# Patient Record
Sex: FEMALE | Race: WHITE
[De-identification: names, ages, dates, MRNs, and addresses within clinical notes are randomized per-mention and may not be internally consistent; named-entity substitution may affect disease eponyms.]

---

## 2020-08-19 ENCOUNTER — HOSPITAL ENCOUNTER (OUTPATIENT)
Dept: HOSPITAL 62 - SC | Age: 45
Discharge: HOME | End: 2020-08-19
Attending: OPHTHALMOLOGY
Payer: OTHER GOVERNMENT

## 2020-08-19 DIAGNOSIS — Z88.8: ICD-10-CM

## 2020-08-19 DIAGNOSIS — Z88.2: ICD-10-CM

## 2020-08-19 DIAGNOSIS — H25.12: Primary | ICD-10-CM

## 2020-08-19 PROCEDURE — V2632 POST CHMBR INTRAOCULAR LENS: HCPCS

## 2020-08-19 PROCEDURE — 66984 XCAPSL CTRC RMVL W/O ECP: CPT

## 2020-08-19 RX ADMIN — BESIFLOXACIN PRN DROP: 6 SUSPENSION OPHTHALMIC at 13:46

## 2020-08-19 RX ADMIN — PREDNISOLONE ACETATE PRN DROP: 10 SUSPENSION/ DROPS OPHTHALMIC at 14:43

## 2020-08-19 RX ADMIN — CYCLOPENTOLATE HYDROCHLORIDE AND PHENYLEPHRINE HYDROCHLORIDE PRN DROP: 2; 10 SOLUTION/ DROPS OPHTHALMIC at 13:46

## 2020-08-19 RX ADMIN — TROPICAMIDE PRN DROP: 10 SOLUTION/ DROPS OPHTHALMIC at 13:46

## 2020-08-19 RX ADMIN — BESIFLOXACIN PRN DROP: 6 SUSPENSION OPHTHALMIC at 00:00

## 2020-08-19 RX ADMIN — TETRACAINE HYDROCHLORIDE PRN DROP: 5 SOLUTION OPHTHALMIC at 14:11

## 2020-08-19 RX ADMIN — DORZOLAMIDE HYDROCHLORIDE AND TIMOLOL MALEATE PRN DROP: 20; 5 SOLUTION OPHTHALMIC at 14:43

## 2020-08-19 RX ADMIN — BESIFLOXACIN PRN DROP: 6 SUSPENSION OPHTHALMIC at 14:43

## 2020-08-19 RX ADMIN — TETRACAINE HYDROCHLORIDE PRN DROP: 5 SOLUTION OPHTHALMIC at 13:46

## 2020-08-19 RX ADMIN — CYCLOPENTOLATE HYDROCHLORIDE AND PHENYLEPHRINE HYDROCHLORIDE PRN DROP: 2; 10 SOLUTION/ DROPS OPHTHALMIC at 13:57

## 2020-08-19 RX ADMIN — TROPICAMIDE PRN DROP: 10 SOLUTION/ DROPS OPHTHALMIC at 14:10

## 2020-08-19 RX ADMIN — TETRACAINE HYDROCHLORIDE PRN DROP: 5 SOLUTION OPHTHALMIC at 14:23

## 2020-08-19 RX ADMIN — PREDNISOLONE ACETATE PRN DROP: 10 SUSPENSION/ DROPS OPHTHALMIC at 00:00

## 2020-08-19 RX ADMIN — CYCLOPENTOLATE HYDROCHLORIDE AND PHENYLEPHRINE HYDROCHLORIDE PRN DROP: 2; 10 SOLUTION/ DROPS OPHTHALMIC at 14:10

## 2020-08-19 RX ADMIN — DORZOLAMIDE HYDROCHLORIDE AND TIMOLOL MALEATE PRN DROP: 20; 5 SOLUTION OPHTHALMIC at 00:00

## 2020-08-19 RX ADMIN — TROPICAMIDE PRN DROP: 10 SOLUTION/ DROPS OPHTHALMIC at 13:57

## 2020-08-19 RX ADMIN — BESIFLOXACIN PRN DROP: 6 SUSPENSION OPHTHALMIC at 13:58

## 2020-08-19 NOTE — OPERATIVE REPORT
Operative Report-[facility]


Operative Report: 





DATE OF SURGERY: August 19, 2020


PREOPERATIVE DIAGNOSIS: NUCLEAR CATARACT, LEFT EYE.


POSTOPERATIVE DIAGNOSIS: NUCLEAR CATARACT, LEFT EYE.


PROCEDURE PERFORMED: PHACOEMULSIFICATION WITH POSTERIOR CHAMBER INTRAOCULAR LENS

IMPLANT, LEFT EYE.


SURGEON: Amandeep El DO


MEDICATIONS AND ANESTHESIA:


Versed: IV Versed Tetracaine drops: 1 to 2 drops given as needed


COMPLICATION: None


INDICATIONS FOR SURGERY: Medical necessity: Best corrected visual acuity worse 

than 20/40 secondary to cataracts with impairment of ability to carry out needs 

or desired activities, blurred vision, visual distortion, reduced contrast 

sensitivity and/or glare with association functional impairment and supporting 

documentation/testing, and cataracts causing symptomatic impairment of visual 

functions not corrected with tolerable changes in glasses or contact lenses 

interfering with activities of daily life.


PROCEDURE:


Consent: The risks, benefits and alternatives of this procedures was discussed 

with the patient. The patient read and signed the consent forms, was identified 

and was seated in the exam chair.


IOL: MX 60 E2.0


IOL Diopters:


Phacoemulsification with posterior chamber intraocular lens implant: The face 

was prepped with 5% povidone iodine solution, and a few drops of 5% povidone 

iodine solution was instilled into the inferior fornix. A non-fenestrated drape 

was placed over the eye and the lids were parted with the speculum. A 

paracentesis was made with a 15 degree blade, and 1% lidocaine MPF followed by 

viscoelastic was injected into the anterior chamber. A 2.4 mm metal micro-

keratome was used to create a temporal clear corneal incision. A circular 

anterior capsulorrhexis was created, followed by hydro-dissection and hydro-

delineation. The phacoemulsification hand piece was inserted and the nucleus was

removed with the Phaco chop technique. The irrigation-aspiration hand piece was 

used to remove the residual cortex, and vacuum the posterior capsule. The 

capsular bag was inflated and viscoelastic and the above-mentioned IOL was 

injected into the eye with care to insert both leaning and trailing haptics in 

the capsular bag. The irrigation/aspiration hand piece was reinserted to remove 

residual viscoelastic from the capsular bag and anterior chamber. The corneal 

incision was hydrated, and anterior chamber was inflated with sterile BSS via 

the paracentesis site, and found to be watertight.


Postop medication:1 drop of prednisolone into operative by followed by 1 drop of

Cosopt into operative eye followed by 1 drop of Besivance intraoperative by  

Other:

## 2020-08-26 ENCOUNTER — HOSPITAL ENCOUNTER (OUTPATIENT)
Dept: HOSPITAL 62 - SC | Age: 45
Discharge: HOME | End: 2020-08-26
Attending: OPHTHALMOLOGY
Payer: COMMERCIAL

## 2020-08-26 DIAGNOSIS — Z98.42: ICD-10-CM

## 2020-08-26 DIAGNOSIS — H25.11: Primary | ICD-10-CM

## 2020-08-26 PROCEDURE — V2632 POST CHMBR INTRAOCULAR LENS: HCPCS

## 2020-08-26 PROCEDURE — 66984 XCAPSL CTRC RMVL W/O ECP: CPT

## 2020-08-26 RX ADMIN — TETRACAINE HYDROCHLORIDE PRN DROP: 5 SOLUTION OPHTHALMIC at 09:44

## 2020-08-26 RX ADMIN — CYCLOPENTOLATE HYDROCHLORIDE AND PHENYLEPHRINE HYDROCHLORIDE PRN DROP: 2; 10 SOLUTION/ DROPS OPHTHALMIC at 09:27

## 2020-08-26 RX ADMIN — TROPICAMIDE PRN DROP: 10 SOLUTION/ DROPS OPHTHALMIC at 09:27

## 2020-08-26 RX ADMIN — TROPICAMIDE PRN DROP: 10 SOLUTION/ DROPS OPHTHALMIC at 09:19

## 2020-08-26 RX ADMIN — BESIFLOXACIN PRN DROP: 6 SUSPENSION OPHTHALMIC at 10:02

## 2020-08-26 RX ADMIN — CYCLOPENTOLATE HYDROCHLORIDE AND PHENYLEPHRINE HYDROCHLORIDE PRN DROP: 2; 10 SOLUTION/ DROPS OPHTHALMIC at 09:17

## 2020-08-26 RX ADMIN — TETRACAINE HYDROCHLORIDE PRN DROP: 5 SOLUTION OPHTHALMIC at 09:16

## 2020-08-26 RX ADMIN — PREDNISOLONE ACETATE PRN DROP: 10 SUSPENSION/ DROPS OPHTHALMIC at 00:00

## 2020-08-26 RX ADMIN — TETRACAINE HYDROCHLORIDE PRN DROP: 5 SOLUTION OPHTHALMIC at 09:27

## 2020-08-26 RX ADMIN — TROPICAMIDE PRN DROP: 10 SOLUTION/ DROPS OPHTHALMIC at 09:17

## 2020-08-26 RX ADMIN — DORZOLAMIDE HYDROCHLORIDE AND TIMOLOL MALEATE PRN DROP: 20; 5 SOLUTION OPHTHALMIC at 10:02

## 2020-08-26 RX ADMIN — PREDNISOLONE ACETATE PRN DROP: 10 SUSPENSION/ DROPS OPHTHALMIC at 10:02

## 2020-08-26 RX ADMIN — BESIFLOXACIN PRN DROP: 6 SUSPENSION OPHTHALMIC at 09:17

## 2020-08-26 RX ADMIN — BESIFLOXACIN PRN DROP: 6 SUSPENSION OPHTHALMIC at 09:27

## 2020-08-26 RX ADMIN — BESIFLOXACIN PRN DROP: 6 SUSPENSION OPHTHALMIC at 00:00

## 2020-08-26 RX ADMIN — DORZOLAMIDE HYDROCHLORIDE AND TIMOLOL MALEATE PRN DROP: 20; 5 SOLUTION OPHTHALMIC at 00:00

## 2020-08-26 RX ADMIN — CYCLOPENTOLATE HYDROCHLORIDE AND PHENYLEPHRINE HYDROCHLORIDE PRN DROP: 2; 10 SOLUTION/ DROPS OPHTHALMIC at 09:19

## 2020-08-26 NOTE — OPERATIVE REPORT
Operative Report[facility]


Operative Report: 





DATE OF SURGERY: August 26, 2020


PREOPERATIVE DIAGNOSIS: NUCLEAR CATARACT, RIGHT EYE.


POSTOPERATIVE DIAGNOSIS: NUCLEAR CATARACT, RIGHT EYE.


PROCEDURE PERFORMED: PHACOEMULSIFICATION WITH POSTERIOR CHAMBER INTRAOCULAR LENS

IMPLANT, RIGHT EYE.


SURGEON: Amandeep El DO


MEDICATIONS AND ANESTHESIA:


Versed: IV Versed Tetracaine drops: 1 to 2 drops given as needed


COMPLICATION: None


INDICATIONS FOR SURGERY: Medical necessity: Best corrected visual acuity worse 

than 20/40 secondary to cataracts with impairment of ability to carry out needs 

or desired activities, blurred vision, visual distortion, reduced contrast 

sensitivity and/or glare with association functional impairment and supporting 

documentation/testing, and cataracts causing symptomatic impairment of visual 

functions not corrected with tolerable changes in glasses or contact lenses 

interfering with activities of daily life.


PROCEDURE:


Consent: The risks, benefits and alternatives of this procedures was discussed 

with the patient. The patient read and signed the consent forms, was identified 

and was seated in the exam chair.


IOL: MX 60 E2.0


IOL Diopters:


Phacoemulsification with posterior chamber intraocular lens implant: The face 

was prepped with 5% povidone iodine solution, and a few drops of 5% povidone 

iodine solution was instilled into the inferior fornix. A non-fenestrated drape 

was placed over the eye and the lids were parted with the speculum. A 

paracentesis was made with a 15 degree blade, and 1% lidocaine MPF followed by 

viscoelastic was injected into the anterior chamber. A 2.4 mm metal micro-

keratome was used to create a temporal clear corneal incision. A circular 

anterior capsulorrhexis was created, followed by hydro-dissection and hydro-

delineation. The phacoemulsification hand piece was inserted and the nucleus was

removed with the Phaco chop technique. The irrigation-aspiration hand piece was 

used to remove the residual cortex, and vacuum the posterior capsule. The 

capsular bag was inflated and viscoelastic and the above-mentioned IOL was 

injected into the eye with care to insert both leaning and trailing haptics in 

the capsular bag. The irrigation/aspiration hand piece was reinserted to remove 

residual viscoelastic from the capsular bag and anterior chamber. The corneal 

incision was hydrated, and anterior chamber was inflated with sterile BSS via 

the paracentesis site, and found to be watertight.


Postop medication: 1 drop of prednisolone into operative by followed by 1 drop 

of Cosopt into operative eye followed by 1 drop of Besivance intraoperative by 

other: